# Patient Record
Sex: MALE | Race: WHITE | NOT HISPANIC OR LATINO | Employment: OTHER | URBAN - METROPOLITAN AREA
[De-identification: names, ages, dates, MRNs, and addresses within clinical notes are randomized per-mention and may not be internally consistent; named-entity substitution may affect disease eponyms.]

---

## 2024-01-11 ENCOUNTER — APPOINTMENT (EMERGENCY)
Dept: CT IMAGING | Facility: HOSPITAL | Age: 59
End: 2024-01-11
Payer: COMMERCIAL

## 2024-01-11 ENCOUNTER — HOSPITAL ENCOUNTER (EMERGENCY)
Facility: HOSPITAL | Age: 59
Discharge: HOME | End: 2024-01-12
Attending: EMERGENCY MEDICINE
Payer: COMMERCIAL

## 2024-01-11 DIAGNOSIS — Z59.00 HOMELESS: ICD-10-CM

## 2024-01-11 DIAGNOSIS — G10 HUNTINGTON CHOREA (HCC): Primary | ICD-10-CM

## 2024-01-11 DIAGNOSIS — G20.A1 PARKINSON DISEASE: ICD-10-CM

## 2024-01-11 LAB
ALBUMIN SERPL BCP-MCNC: 4.3 G/DL (ref 3.5–5)
ALP SERPL-CCNC: 78 U/L (ref 34–104)
ALT SERPL W P-5'-P-CCNC: 20 U/L (ref 7–52)
AMORPH URATE CRY URNS QL MICRO: NORMAL
AMPHETAMINES SERPL QL SCN: POSITIVE
ANION GAP SERPL CALCULATED.3IONS-SCNC: 4 MMOL/L
AST SERPL W P-5'-P-CCNC: 22 U/L (ref 13–39)
BACTERIA UR QL AUTO: NORMAL /HPF
BARBITURATES UR QL: NEGATIVE
BASOPHILS # BLD AUTO: 0.04 THOUSANDS/ÂΜL (ref 0–0.1)
BASOPHILS NFR BLD AUTO: 1 % (ref 0–1)
BENZODIAZ UR QL: NEGATIVE
BILIRUB SERPL-MCNC: 0.28 MG/DL (ref 0.2–1)
BILIRUB UR QL STRIP: NEGATIVE
BUN SERPL-MCNC: 24 MG/DL (ref 5–25)
CALCIUM SERPL-MCNC: 9.6 MG/DL (ref 8.4–10.2)
CARDIAC TROPONIN I PNL SERPL HS: 3 NG/L
CHLORIDE SERPL-SCNC: 106 MMOL/L (ref 96–108)
CK SERPL-CCNC: 282 U/L (ref 39–308)
CLARITY UR: ABNORMAL
CO2 SERPL-SCNC: 32 MMOL/L (ref 21–32)
COCAINE UR QL: NEGATIVE
COLOR UR: YELLOW
CREAT SERPL-MCNC: 0.98 MG/DL (ref 0.6–1.3)
EOSINOPHIL # BLD AUTO: 0.11 THOUSAND/ÂΜL (ref 0–0.61)
EOSINOPHIL NFR BLD AUTO: 2 % (ref 0–6)
ERYTHROCYTE [DISTWIDTH] IN BLOOD BY AUTOMATED COUNT: 14 % (ref 11.6–15.1)
ETHANOL SERPL-MCNC: <10 MG/DL
GFR SERPL CREATININE-BSD FRML MDRD: 84 ML/MIN/1.73SQ M
GLUCOSE SERPL-MCNC: 123 MG/DL (ref 65–140)
GLUCOSE UR STRIP-MCNC: NEGATIVE MG/DL
HCT VFR BLD AUTO: 40.8 % (ref 36.5–49.3)
HGB BLD-MCNC: 13.1 G/DL (ref 12–17)
HGB UR QL STRIP.AUTO: NEGATIVE
HOLD SPECIMEN: NORMAL
HOLD SPECIMEN: NORMAL
IMM GRANULOCYTES # BLD AUTO: 0.02 THOUSAND/UL (ref 0–0.2)
IMM GRANULOCYTES NFR BLD AUTO: 0 % (ref 0–2)
KETONES UR STRIP-MCNC: NEGATIVE MG/DL
LEUKOCYTE ESTERASE UR QL STRIP: NEGATIVE
LYMPHOCYTES # BLD AUTO: 2.01 THOUSANDS/ÂΜL (ref 0.6–4.47)
LYMPHOCYTES NFR BLD AUTO: 32 % (ref 14–44)
MCH RBC QN AUTO: 28.1 PG (ref 26.8–34.3)
MCHC RBC AUTO-ENTMCNC: 32.1 G/DL (ref 31.4–37.4)
MCV RBC AUTO: 87 FL (ref 82–98)
METHADONE UR QL: NEGATIVE
MONOCYTES # BLD AUTO: 0.53 THOUSAND/ÂΜL (ref 0.17–1.22)
MONOCYTES NFR BLD AUTO: 8 % (ref 4–12)
NEUTROPHILS # BLD AUTO: 3.67 THOUSANDS/ÂΜL (ref 1.85–7.62)
NEUTS SEG NFR BLD AUTO: 57 % (ref 43–75)
NITRITE UR QL STRIP: NEGATIVE
NON-SQ EPI CELLS URNS QL MICRO: NORMAL /HPF
NRBC BLD AUTO-RTO: 0 /100 WBCS
OPIATES UR QL SCN: NEGATIVE
OXYCODONE+OXYMORPHONE UR QL SCN: NEGATIVE
PCP UR QL: NEGATIVE
PH UR STRIP.AUTO: 7 [PH]
PLATELET # BLD AUTO: 229 THOUSANDS/UL (ref 149–390)
PMV BLD AUTO: 10 FL (ref 8.9–12.7)
POTASSIUM SERPL-SCNC: 4.2 MMOL/L (ref 3.5–5.3)
PROT SERPL-MCNC: 6.4 G/DL (ref 6.4–8.4)
PROT UR STRIP-MCNC: ABNORMAL MG/DL
RBC # BLD AUTO: 4.67 MILLION/UL (ref 3.88–5.62)
RBC #/AREA URNS AUTO: NORMAL /HPF
SODIUM SERPL-SCNC: 142 MMOL/L (ref 135–147)
SP GR UR STRIP.AUTO: 1.02 (ref 1–1.03)
THC UR QL: NEGATIVE
TSH SERPL DL<=0.05 MIU/L-ACNC: 0.49 UIU/ML (ref 0.45–4.5)
UROBILINOGEN UR STRIP-ACNC: <2 MG/DL
WBC # BLD AUTO: 6.38 THOUSAND/UL (ref 4.31–10.16)
WBC #/AREA URNS AUTO: NORMAL /HPF

## 2024-01-11 PROCEDURE — 70450 CT HEAD/BRAIN W/O DYE: CPT

## 2024-01-11 PROCEDURE — G1004 CDSM NDSC: HCPCS

## 2024-01-11 PROCEDURE — 82077 ASSAY SPEC XCP UR&BREATH IA: CPT | Performed by: EMERGENCY MEDICINE

## 2024-01-11 PROCEDURE — 99285 EMERGENCY DEPT VISIT HI MDM: CPT | Performed by: EMERGENCY MEDICINE

## 2024-01-11 PROCEDURE — 82550 ASSAY OF CK (CPK): CPT | Performed by: EMERGENCY MEDICINE

## 2024-01-11 PROCEDURE — 84484 ASSAY OF TROPONIN QUANT: CPT | Performed by: EMERGENCY MEDICINE

## 2024-01-11 PROCEDURE — 36415 COLL VENOUS BLD VENIPUNCTURE: CPT

## 2024-01-11 PROCEDURE — 84443 ASSAY THYROID STIM HORMONE: CPT | Performed by: EMERGENCY MEDICINE

## 2024-01-11 PROCEDURE — 81001 URINALYSIS AUTO W/SCOPE: CPT

## 2024-01-11 PROCEDURE — 80307 DRUG TEST PRSMV CHEM ANLYZR: CPT

## 2024-01-11 PROCEDURE — 93005 ELECTROCARDIOGRAM TRACING: CPT

## 2024-01-11 PROCEDURE — 80053 COMPREHEN METABOLIC PANEL: CPT | Performed by: EMERGENCY MEDICINE

## 2024-01-11 PROCEDURE — 99284 EMERGENCY DEPT VISIT MOD MDM: CPT

## 2024-01-11 PROCEDURE — 85025 COMPLETE CBC W/AUTO DIFF WBC: CPT | Performed by: EMERGENCY MEDICINE

## 2024-01-11 SDOH — ECONOMIC STABILITY - HOUSING INSECURITY: HOMELESSNESS UNSPECIFIED: Z59.00

## 2024-01-12 VITALS
SYSTOLIC BLOOD PRESSURE: 119 MMHG | BODY MASS INDEX: 33.86 KG/M2 | TEMPERATURE: 98 F | RESPIRATION RATE: 18 BRPM | WEIGHT: 250 LBS | DIASTOLIC BLOOD PRESSURE: 77 MMHG | HEIGHT: 72 IN | OXYGEN SATURATION: 96 % | HEART RATE: 77 BPM

## 2024-01-12 LAB
ATRIAL RATE: 78 BPM
P AXIS: 61 DEGREES
PR INTERVAL: 120 MS
QRS AXIS: 68 DEGREES
QRSD INTERVAL: 80 MS
QT INTERVAL: 344 MS
QTC INTERVAL: 392 MS
T WAVE AXIS: 70 DEGREES
VENTRICULAR RATE: 78 BPM

## 2024-01-12 NOTE — CASE MANAGEMENT
Case Management ED Discharge Planning Note    Patient name Sagar Lima  Location ED 04/ED 04 MRN [id number]  : 1965 Date 2024        OBJECTIVE:  Predictive Model Details          46%  Factor Value    Risk of Hospital Admission or ED Visit Model 61% Number of ED Visits 1     19% Is in Relationship No     11% Has Depression Yes     10% Has Diabetes Yes            Chief Complaint: Tremors of nervous system .  Patient Class: Emergency  Preferred Pharmacy:   UNKNOWN - FOLLOW UP PRIOR TO DISCHARGE TO E-PRESCRIBE  No address on file      Primary Care Provider: No primary care provider on file.    Primary Insurance: [employer]  Secondary Insurance:     ED Discharge Details:    Discharge planning discussed with:: Pt at bedside  [place] of Choice: Yes     CM contacted family/caregiver?: No- see comments (pt refused to have CM call his brother)  Were Treatment Team discharge recommendations reviewed with patient/caregiver?: Yes  Did patient/caregiver verbalize understanding of patient care needs?: Yes  Were patient/caregiver advised of the risks associated with not following Treatment Team discharge recommendations?: Yes         Requested Home Health Care         Is the patient interested in HHC at discharge?: No    DME Referral Provided  Referral made for DME?: No      Additional Comments: Met with pt at bedside in the ED to review consult for housing issues. Pt stated that he lives with his brother but was driving [place] [address] looking for housing. Pt stated that he is living with his brother in a 2SH with no steps to enter. Pt stated that he has been independent wiht all ADL care prior to admission. Pt stated that he has no hx with DME, HHC or STR placement. Pt was very resistant to speaking with CM regarding his housing and care needs. Pt stated that he is connected with housing supports and refuses any additional housing info or support services. [facility] [place]. Pt's  car is in the lot and will return to his home in [place] at d/c. Updated the pt's RN.

## 2024-01-12 NOTE — ED NOTES
Error message     Shannon Teixeira RN  01/12/24 1104    
Patient transported to CT     Miguel Larson RN  01/11/24 9019    
Pt is alert and oriented in room. Pt has no complains at the moment. Will continue to monitor pt.      Zeyad Murray RN  01/12/24 0900    
Pt reports that he's okay walking and does not use any assistive devices. Offered a wheelchair which he declined.      Zeyad Murray RN  01/12/24 2559    
Never

## 2024-01-12 NOTE — CASE MANAGEMENT
Case Management ED Assessment    Patient name Sagar Rashid ED 04/ED 04 MRN [id number]  : 1965 Date 2024        OBJECTIVE:  Predictive Model Details          46%  Factor Value    Risk of Hospital Admission or ED Visit Model 61% Number of ED Visits 1     19% Is in Relationship No     11% Has Depression Yes     10% Has Diabetes Yes            Chief Complaint: Tremors of nervous system .  Patient Class: Emergency  Preferred Pharmacy:   UNKNOWN - FOLLOW UP PRIOR TO DISCHARGE TO E-PRESCRIBE  No address on file      Primary Care Provider: No primary care provider on file.    Primary Insurance: [employer]  Secondary Insurance:     ED ASSESSMENT:  Active Health Care Proxies    There are no active Health Care Proxies on file.         Patient Information  Admitted from:: Home  Mental Status: Alert  During Assessment patient was accompanied by: Not accompanied during assessment  Assessment information provided by:: Patient  Primary Caregiver: Self  Support Systems: Self, Family members  What city do you live in?: [place]  Home entry access options. Select all that apply.: No steps to enter home  Type of Current Residence: 2 story home  Upon entering residence, is there a bedroom on the main floor (no further steps)?: Yes  Upon entering residence, is there a bathroom on the main floor (no further steps)?: Yes  Living Arrangements: Lives w/ Extended Family  Is patient a ?: Yes  Is [facility] with [facility] ( [email or web address])?: Yes  Is patient service connected?: Yes ([facility] with [facility]    Patient Information Continued  Income Source: SSI/SSD  Does patient have prescription coverage?: Yes  Does patient receive dialysis treatments?: No  Does patient have a history of substance abuse?: No  Does patient have a history of Mental Health Diagnosis?: No

## 2024-01-12 NOTE — ED PROVIDER NOTES
"History  Chief Complaint   Patient presents with   • Medical Problem     [place] [address], did not have his lights on, pt was not driving right per ems. Ems followed pt. Hx of parkinson's. Lived with family in [place]. Family is on vacation. Pt ao x 4     58-year-old male with history of [place]yl's chorea, parkinsonism, tardive dyskinesia presents for evaluation after being found driving erratically on [address], patient has no specific complaints states that he was living with his brother in [place] but no longer wanted to live there and was driving north on [address], states that he is looking for \"a new house\".  [facility] [place] however he no longer lives there.  Denies any drug or alcohol use denies any current complaints. AAOx3.    When asked if I am able to speak to his brother he did not want to me to reach out to him        None       Past Medical History:   Diagnosis Date   • Parkinson's disease        History reviewed. No pertinent surgical history.    History reviewed. No pertinent family history.  I have reviewed and agree with the history as documented.    E-Cigarette/Vaping   • E-Cigarette Use Current Every Day User      E-Cigarette/Vaping Substances   • Nicotine Yes    • Flavoring Yes      Social History     Tobacco Use   • Smoking status: Never   • Smokeless tobacco: Never   Vaping Use   • Vaping status: Every Day   • Substances: Nicotine, Flavoring   Substance Use Topics   • Alcohol use: Not Currently   • Drug use: Not Currently       Review of Systems   Constitutional:  Negative for appetite change, chills and fever.   HENT:  Negative for rhinorrhea and sore throat.    Eyes:  Negative for photophobia and visual disturbance.   Respiratory:  Negative for cough and shortness of breath.    Cardiovascular:  Negative for chest pain and palpitations.   Gastrointestinal:  Negative for abdominal pain and diarrhea.   Genitourinary:  Negative for dysuria, frequency and " urgency.   Skin:  Negative for rash.   Neurological:  Negative for dizziness and weakness.   All other systems reviewed and are negative.      Physical Exam  Physical Exam  Vitals and nursing note reviewed.   Constitutional:       Appearance: He is well-developed.   HENT:      Head: Normocephalic and atraumatic.      Right Ear: External ear normal.      Left Ear: External ear normal.   Eyes:      Conjunctiva/sclera: Conjunctivae normal.      Pupils: Pupils are equal, round, and reactive to light.   Neck:      Vascular: No JVD.      Trachea: No tracheal deviation.   Cardiovascular:      Rate and Rhythm: Normal rate and regular rhythm.      Heart sounds: Normal heart sounds. No murmur heard.     No friction rub. No gallop.   Pulmonary:      Effort: Pulmonary effort is normal. No respiratory distress.      Breath sounds: No stridor. No wheezing or rales.   Abdominal:      General: There is no distension.      Palpations: Abdomen is soft. There is no mass.      Tenderness: There is no abdominal tenderness. There is no guarding or rebound.   Musculoskeletal:         General: Normal range of motion.      Cervical back: Normal range of motion and neck supple.   Skin:     General: Skin is warm and dry.      Coloration: Skin is not pale.      Findings: No erythema or rash.   Neurological:      General: No focal deficit present.      Mental Status: He is alert and oriented to person, place, and time.      Cranial Nerves: No cranial nerve deficit.      Comments: Constant erratic movement no rigidity no clonus         Vital Signs  ED Triage Vitals [01/11/24 2127]   Temperature Pulse Respirations Blood Pressure SpO2   98 °F (36.7 °C) 97 18 168/100 97 %      Temp Source Heart Rate Source Patient Position - Orthostatic VS BP Location FiO2 (%)   Oral Monitor -- Right arm --      Pain Score       No Pain           Vitals:    01/11/24 2127   BP: 168/100   Pulse: 97         Visual Acuity      ED Medications  Medications - No data to  display    Diagnostic Studies  Results Reviewed       Procedure Component Value Units Date/Time    TSH, 3rd generation with Free T4 reflex [[id number]]     Lab Status: No result Specimen: Blood     HS Troponin 0hr (reflex protocol) [[id number]]     Lab Status: No result Specimen: Blood     CK [[id number]]     Lab Status: No result Specimen: Blood     Comprehensive metabolic panel [[id number]] Collected: 01/11/24 2132    Lab Status: In process Specimen: Blood from Arm, Right Updated: 01/11/24 2208    Ethanol [[id number]] Collected: 01/11/24 2132    Lab Status: In process Specimen: Blood from Arm, Right Updated: 01/11/24 2207    CBC and differential [[id number]] Collected: 01/11/24 2132    Lab Status: Final result Specimen: Blood from Arm, Right Updated: 01/11/24 2206     WBC 6.38 Thousand/uL      RBC 4.67 Million/uL      Hemoglobin 13.1 g/dL      Hematocrit 40.8 %      MCV 87 fL      MCH 28.1 pg      MCHC 32.1 g/dL      RDW 14.0 %      MPV 10.0 fL      Platelets 229 Thousands/uL      nRBC 0 /100 WBCs      Neutrophils Relative 57 %      Immat GRANS % 0 %      Lymphocytes Relative 32 %      Monocytes Relative 8 %      Eosinophils Relative 2 %      Basophils Relative 1 %      Neutrophils Absolute 3.67 Thousands/µL      Immature Grans Absolute 0.02 Thousand/uL      Lymphocytes Absolute 2.01 Thousands/µL      Monocytes Absolute 0.53 Thousand/µL      Eosinophils Absolute 0.11 Thousand/µL      Basophils Absolute 0.04 Thousands/µL     [facility] draw [[id number]] Collected: 01/11/24 2132    Lab Status: In process Specimen: Blood from Arm, Right Updated: 01/11/24 2141    Narrative:      The following orders were created for panel order [place] draw.  Procedure                               Abnormality         Status                     ---------                               -----------         ------                     Light Blue Top on hold[[id number]]                           In process                 Gold top on hold[[id number]]                                  In process                 Green / Yellow tube on hold[[id number]]                      In process                 Green / Black tube on hold[[id number]]                       In process                 Lavender Top 3 ml on hold[[id number]]                        In process                   Please view results for these tests on the individual orders.    UA w Reflex to Microscopic w Reflex to Culture [[id number]] Collected: 01/11/24 2138    Lab Status: In process Specimen: Urine, Clean Catch Updated: 01/11/24 2141    Rapid drug screen, urine [[id number]] Collected: 01/11/24 2138    Lab Status: In process Specimen: Urine, Clean Catch Updated: 01/11/24 2141                   CT head without contrast    (Results Pending)              Procedures  Procedures         ED Course  ED Course as of 01/12/24 0637   Fri Jan 12, 2024   0006 AMPH/METH(!): Positive  On adderall     0006 Procedure Note: EKG  Date/Time: 01/12/24 12:06 AM   Performed by: GRETTA MIRAMONTES  Authorized by: GRETTA MIRAMONTES  Indications / Diagnosis: CP  ECG reviewed by me, the ED Provider: yes   The EKG demonstrates:  Rhythm: normal sinus  Intervals: normal intervals  Axis: normal axis  QRS/Blocks:normal QRS  ST Changes: No acute ST Changes, no STD/PRUDENCIO.       0006 At baseline status , no indication for further inpatient evaluation ,however unsafe to be discharged at this point due to his chronic medical conditions will have case management see the patient in the morning                               SBIRT 20yo+      Flowsheet Row Most Recent Value   Initial Alcohol Screen: US AUDIT-C     1. How often do you have a drink containing alcohol? 0 Filed at: 01/11/2024 2126   2. How many drinks containing alcohol do you have on a typical day you are drinking?  0 Filed at: 01/11/2024 2126   3a. Male UNDER 65: How often do you have five or more drinks on one occasion? 0 Filed at: 01/11/2024 2126   3b. FEMALE Any Age, or MALE 65+: How often do you  have 4 or more drinks on one occassion? 0 Filed at: 01/11/2024 2126   Audit-C Score 0 Filed at: 01/11/2024 2126   NALDO: How many times in the past year have you...    Used an illegal drug or used a prescription medication for non-medical reasons? Never Filed at: 01/11/2024 2126                      Medical Decision Making  58-year-old male presumably homeless with [place]'s chorea parkinsonism,tardive dyskinesia presents for evaluation after being found driving erratically on [address] no external evidence of traumatic injury, will obtain EKG to evaluate for arrhythmia, lab work to evaluate for electrolyte abnormalities dehydration, rhabdomyolysis, CT head to evaluate for intracranial lesion, bleeding, UA to evaluate for infection    Amount and/or Complexity of Data Reviewed  Labs: ordered.  Radiology: ordered.             Disposition  Final diagnoses:   None     ED Disposition       None          Follow-up Information    None         Patient's Medications    No medications on file       No discharge procedures on file.    PDMP Review       None            ED Provider  Electronically Signed by             Adelina Snell DO  01/12/24 0637